# Patient Record
Sex: FEMALE | Race: BLACK OR AFRICAN AMERICAN | Employment: UNEMPLOYED | URBAN - METROPOLITAN AREA
[De-identification: names, ages, dates, MRNs, and addresses within clinical notes are randomized per-mention and may not be internally consistent; named-entity substitution may affect disease eponyms.]

---

## 2023-01-18 ENCOUNTER — APPOINTMENT (OUTPATIENT)
Dept: ULTRASOUND IMAGING | Age: 38
End: 2023-01-18

## 2023-01-18 ENCOUNTER — ANESTHESIA (OUTPATIENT)
Dept: OPERATING ROOM | Age: 38
End: 2023-01-18

## 2023-01-18 ENCOUNTER — ANESTHESIA EVENT (OUTPATIENT)
Dept: OPERATING ROOM | Age: 38
End: 2023-01-18

## 2023-01-18 ENCOUNTER — HOSPITAL ENCOUNTER (OUTPATIENT)
Age: 38
Discharge: HOME | End: 2023-01-19
Attending: OBSTETRICS & GYNECOLOGY | Admitting: OBSTETRICS & GYNECOLOGY

## 2023-01-18 DIAGNOSIS — O00.90 ECTOPIC PREGNANCY, UNSPECIFIED LOCATION, UNSPECIFIED WHETHER INTRAUTERINE PREGNANCY PRESENT: ICD-10-CM

## 2023-01-18 PROBLEM — O20.9 BLEEDING IN EARLY PREGNANCY: Status: ACTIVE | Noted: 2023-01-18

## 2023-01-18 LAB
ABO/RH: NORMAL
ABO/RH: NORMAL
AMPHETAMINES: NEGATIVE
ANTIBODY SCREEN: NEGATIVE
BACTERIA: ABNORMAL /HPF
BARBITURATE SCREEN URINE: NEGATIVE
BENZODIAZEPINE SCREEN, URINE: NEGATIVE
BILIRUBIN URINE: NEGATIVE MG/DL
BLOOD, URINE: NORMAL
CANNABINOID SCREEN URINE: NEGATIVE
CLARITY: CLEAR
COCAINE METABOLITE: NEGATIVE
COLOR: YELLOW
GLUCOSE, URINE: NEGATIVE MG/DL
GONADOTROPIN, CHORIONIC (HCG) QUANT: NORMAL UIU/ML
HCT VFR BLD CALC: 38.8 % (ref 37–47)
HEMOGLOBIN: 13.6 GM/DL (ref 12.5–16)
KETONES, URINE: NEGATIVE MG/DL
LEUKOCYTE ESTERASE, URINE: NORMAL
MCH RBC QN AUTO: 31.6 PG (ref 27–31)
MCHC RBC AUTO-ENTMCNC: 35.1 % (ref 32–36)
MCV RBC AUTO: 90 FL (ref 78–100)
MUCUS: ABNORMAL HPF
NITRITE URINE, QUANTITATIVE: NEGATIVE
OPIATES, URINE: NEGATIVE
OXYCODONE: NEGATIVE
PDW BLD-RTO: 11.9 % (ref 11.7–14.9)
PH, URINE: 7.5 (ref 5–8)
PHENCYCLIDINE, URINE: NEGATIVE
PLATELET # BLD: 327 K/CU MM (ref 140–440)
PMV BLD AUTO: 9.7 FL (ref 7.5–11.1)
PREGNANCY TEST URINE, POC: POSITIVE
PROTEIN UA: NEGATIVE MG/DL
RBC # BLD: 4.31 M/CU MM (ref 4.2–5.4)
RBC URINE: 7 /HPF (ref 0–6)
SPECIFIC GRAVITY UA: 1.01 (ref 1–1.03)
SQUAMOUS EPITHELIAL: 3 /HPF
TRICHOMONAS: ABNORMAL /HPF
UROBILINOGEN, URINE: 0.2 MG/DL (ref 0.2–1)
WBC # BLD: 5.1 K/CU MM (ref 4–10.5)
WBC UA: 6 /HPF (ref 0–5)

## 2023-01-18 PROCEDURE — 88302 TISSUE EXAM BY PATHOLOGIST: CPT | Performed by: PATHOLOGY

## 2023-01-18 PROCEDURE — 99214 OFFICE O/P EST MOD 30 MIN: CPT

## 2023-01-18 PROCEDURE — 85027 COMPLETE CBC AUTOMATED: CPT

## 2023-01-18 PROCEDURE — 2709999900 HC NON-CHARGEABLE SUPPLY: Performed by: OBSTETRICS & GYNECOLOGY

## 2023-01-18 PROCEDURE — 81025 URINE PREGNANCY TEST: CPT

## 2023-01-18 PROCEDURE — 81001 URINALYSIS AUTO W/SCOPE: CPT

## 2023-01-18 PROCEDURE — 76817 TRANSVAGINAL US OBSTETRIC: CPT

## 2023-01-18 PROCEDURE — 6370000000 HC RX 637 (ALT 250 FOR IP): Performed by: OBSTETRICS & GYNECOLOGY

## 2023-01-18 PROCEDURE — 3600000014 HC SURGERY LEVEL 4 ADDTL 15MIN: Performed by: OBSTETRICS & GYNECOLOGY

## 2023-01-18 PROCEDURE — 80307 DRUG TEST PRSMV CHEM ANLYZR: CPT

## 2023-01-18 PROCEDURE — 3700000001 HC ADD 15 MINUTES (ANESTHESIA): Performed by: OBSTETRICS & GYNECOLOGY

## 2023-01-18 PROCEDURE — 7100000000 HC PACU RECOVERY - FIRST 15 MIN: Performed by: OBSTETRICS & GYNECOLOGY

## 2023-01-18 PROCEDURE — 7100000001 HC PACU RECOVERY - ADDTL 15 MIN: Performed by: OBSTETRICS & GYNECOLOGY

## 2023-01-18 PROCEDURE — 84702 CHORIONIC GONADOTROPIN TEST: CPT

## 2023-01-18 PROCEDURE — 3700000000 HC ANESTHESIA ATTENDED CARE: Performed by: OBSTETRICS & GYNECOLOGY

## 2023-01-18 PROCEDURE — 93975 VASCULAR STUDY: CPT

## 2023-01-18 PROCEDURE — 86901 BLOOD TYPING SEROLOGIC RH(D): CPT

## 2023-01-18 PROCEDURE — 2500000003 HC RX 250 WO HCPCS

## 2023-01-18 PROCEDURE — 2720000010 HC SURG SUPPLY STERILE: Performed by: OBSTETRICS & GYNECOLOGY

## 2023-01-18 PROCEDURE — 86850 RBC ANTIBODY SCREEN: CPT

## 2023-01-18 PROCEDURE — 86900 BLOOD TYPING SEROLOGIC ABO: CPT

## 2023-01-18 PROCEDURE — 6360000002 HC RX W HCPCS

## 2023-01-18 PROCEDURE — 3600000004 HC SURGERY LEVEL 4 BASE: Performed by: OBSTETRICS & GYNECOLOGY

## 2023-01-18 RX ORDER — DIPHENHYDRAMINE HYDROCHLORIDE 50 MG/ML
12.5 INJECTION INTRAMUSCULAR; INTRAVENOUS
Status: DISCONTINUED | OUTPATIENT
Start: 2023-01-18 | End: 2023-01-18 | Stop reason: HOSPADM

## 2023-01-18 RX ORDER — PROCHLORPERAZINE EDISYLATE 5 MG/ML
5 INJECTION INTRAMUSCULAR; INTRAVENOUS
Status: DISCONTINUED | OUTPATIENT
Start: 2023-01-18 | End: 2023-01-18 | Stop reason: HOSPADM

## 2023-01-18 RX ORDER — MIDAZOLAM HYDROCHLORIDE 1 MG/ML
INJECTION INTRAMUSCULAR; INTRAVENOUS PRN
Status: DISCONTINUED | OUTPATIENT
Start: 2023-01-18 | End: 2023-01-18 | Stop reason: SDUPTHER

## 2023-01-18 RX ORDER — MIDAZOLAM HYDROCHLORIDE 2 MG/2ML
2 INJECTION, SOLUTION INTRAMUSCULAR; INTRAVENOUS
Status: DISCONTINUED | OUTPATIENT
Start: 2023-01-18 | End: 2023-01-18 | Stop reason: HOSPADM

## 2023-01-18 RX ORDER — IPRATROPIUM BROMIDE AND ALBUTEROL SULFATE 2.5; .5 MG/3ML; MG/3ML
1 SOLUTION RESPIRATORY (INHALATION)
Status: DISCONTINUED | OUTPATIENT
Start: 2023-01-18 | End: 2023-01-18 | Stop reason: HOSPADM

## 2023-01-18 RX ORDER — SODIUM CHLORIDE 0.9 % (FLUSH) 0.9 %
5-40 SYRINGE (ML) INJECTION EVERY 12 HOURS SCHEDULED
Status: DISCONTINUED | OUTPATIENT
Start: 2023-01-18 | End: 2023-01-19 | Stop reason: HOSPADM

## 2023-01-18 RX ORDER — ONDANSETRON 2 MG/ML
4 INJECTION INTRAMUSCULAR; INTRAVENOUS EVERY 6 HOURS PRN
Status: DISCONTINUED | OUTPATIENT
Start: 2023-01-18 | End: 2023-01-19 | Stop reason: HOSPADM

## 2023-01-18 RX ORDER — OXYCODONE HYDROCHLORIDE AND ACETAMINOPHEN 5; 325 MG/1; MG/1
1 TABLET ORAL EVERY 6 HOURS PRN
Qty: 14 TABLET | Refills: 0 | Status: SHIPPED | OUTPATIENT
Start: 2023-01-18 | End: 2023-01-18 | Stop reason: SDUPTHER

## 2023-01-18 RX ORDER — OXYCODONE HYDROCHLORIDE 5 MG/1
5 TABLET ORAL EVERY 4 HOURS PRN
Status: DISCONTINUED | OUTPATIENT
Start: 2023-01-18 | End: 2023-01-19 | Stop reason: HOSPADM

## 2023-01-18 RX ORDER — MEPERIDINE HYDROCHLORIDE 25 MG/ML
6.25 INJECTION INTRAMUSCULAR; INTRAVENOUS; SUBCUTANEOUS EVERY 5 MIN PRN
Status: DISCONTINUED | OUTPATIENT
Start: 2023-01-18 | End: 2023-01-18 | Stop reason: HOSPADM

## 2023-01-18 RX ORDER — OXYCODONE HYDROCHLORIDE AND ACETAMINOPHEN 5; 325 MG/1; MG/1
1 TABLET ORAL EVERY 6 HOURS PRN
Qty: 14 TABLET | Refills: 0 | Status: SHIPPED | OUTPATIENT
Start: 2023-01-18 | End: 2023-01-23

## 2023-01-18 RX ORDER — DEXMEDETOMIDINE HYDROCHLORIDE 100 UG/ML
INJECTION, SOLUTION INTRAVENOUS PRN
Status: DISCONTINUED | OUTPATIENT
Start: 2023-01-18 | End: 2023-01-18 | Stop reason: SDUPTHER

## 2023-01-18 RX ORDER — IBUPROFEN 800 MG/1
800 TABLET ORAL EVERY 8 HOURS PRN
Status: DISCONTINUED | OUTPATIENT
Start: 2023-01-18 | End: 2023-01-19 | Stop reason: HOSPADM

## 2023-01-18 RX ORDER — LABETALOL HYDROCHLORIDE 5 MG/ML
10 INJECTION, SOLUTION INTRAVENOUS
Status: DISCONTINUED | OUTPATIENT
Start: 2023-01-18 | End: 2023-01-18 | Stop reason: HOSPADM

## 2023-01-18 RX ORDER — ONDANSETRON 4 MG/1
4 TABLET, ORALLY DISINTEGRATING ORAL EVERY 8 HOURS PRN
Status: DISCONTINUED | OUTPATIENT
Start: 2023-01-18 | End: 2023-01-19 | Stop reason: HOSPADM

## 2023-01-18 RX ORDER — DEXAMETHASONE SODIUM PHOSPHATE 4 MG/ML
INJECTION, SOLUTION INTRA-ARTICULAR; INTRALESIONAL; INTRAMUSCULAR; INTRAVENOUS; SOFT TISSUE PRN
Status: DISCONTINUED | OUTPATIENT
Start: 2023-01-18 | End: 2023-01-18 | Stop reason: SDUPTHER

## 2023-01-18 RX ORDER — OXYCODONE HYDROCHLORIDE 5 MG/1
10 TABLET ORAL EVERY 4 HOURS PRN
Status: DISCONTINUED | OUTPATIENT
Start: 2023-01-18 | End: 2023-01-19 | Stop reason: HOSPADM

## 2023-01-18 RX ORDER — FENTANYL CITRATE 50 UG/ML
50 INJECTION, SOLUTION INTRAMUSCULAR; INTRAVENOUS EVERY 5 MIN PRN
Status: DISCONTINUED | OUTPATIENT
Start: 2023-01-18 | End: 2023-01-18 | Stop reason: HOSPADM

## 2023-01-18 RX ORDER — HALOPERIDOL 5 MG/ML
1 INJECTION INTRAMUSCULAR
Status: DISCONTINUED | OUTPATIENT
Start: 2023-01-18 | End: 2023-01-18 | Stop reason: HOSPADM

## 2023-01-18 RX ORDER — HYDRALAZINE HYDROCHLORIDE 20 MG/ML
10 INJECTION INTRAMUSCULAR; INTRAVENOUS
Status: DISCONTINUED | OUTPATIENT
Start: 2023-01-18 | End: 2023-01-18 | Stop reason: HOSPADM

## 2023-01-18 RX ORDER — SODIUM CHLORIDE 9 MG/ML
INJECTION, SOLUTION INTRAVENOUS PRN
Status: DISCONTINUED | OUTPATIENT
Start: 2023-01-18 | End: 2023-01-19 | Stop reason: HOSPADM

## 2023-01-18 RX ORDER — FENTANYL CITRATE 50 UG/ML
INJECTION, SOLUTION INTRAMUSCULAR; INTRAVENOUS PRN
Status: DISCONTINUED | OUTPATIENT
Start: 2023-01-18 | End: 2023-01-18 | Stop reason: SDUPTHER

## 2023-01-18 RX ORDER — KETOROLAC TROMETHAMINE 30 MG/ML
INJECTION, SOLUTION INTRAMUSCULAR; INTRAVENOUS PRN
Status: DISCONTINUED | OUTPATIENT
Start: 2023-01-18 | End: 2023-01-18 | Stop reason: SDUPTHER

## 2023-01-18 RX ORDER — ONDANSETRON 2 MG/ML
INJECTION INTRAMUSCULAR; INTRAVENOUS PRN
Status: DISCONTINUED | OUTPATIENT
Start: 2023-01-18 | End: 2023-01-18 | Stop reason: SDUPTHER

## 2023-01-18 RX ORDER — ROCURONIUM BROMIDE 10 MG/ML
INJECTION, SOLUTION INTRAVENOUS PRN
Status: DISCONTINUED | OUTPATIENT
Start: 2023-01-18 | End: 2023-01-18 | Stop reason: SDUPTHER

## 2023-01-18 RX ORDER — PROPOFOL 10 MG/ML
INJECTION, EMULSION INTRAVENOUS PRN
Status: DISCONTINUED | OUTPATIENT
Start: 2023-01-18 | End: 2023-01-18 | Stop reason: SDUPTHER

## 2023-01-18 RX ORDER — ACETAMINOPHEN 500 MG
1000 TABLET ORAL EVERY 8 HOURS PRN
Status: DISCONTINUED | OUTPATIENT
Start: 2023-01-18 | End: 2023-01-19 | Stop reason: HOSPADM

## 2023-01-18 RX ORDER — SODIUM CHLORIDE, SODIUM LACTATE, POTASSIUM CHLORIDE, CALCIUM CHLORIDE 600; 310; 30; 20 MG/100ML; MG/100ML; MG/100ML; MG/100ML
INJECTION, SOLUTION INTRAVENOUS CONTINUOUS PRN
Status: DISCONTINUED | OUTPATIENT
Start: 2023-01-18 | End: 2023-01-18 | Stop reason: SDUPTHER

## 2023-01-18 RX ORDER — SODIUM CHLORIDE 0.9 % (FLUSH) 0.9 %
5-40 SYRINGE (ML) INJECTION PRN
Status: DISCONTINUED | OUTPATIENT
Start: 2023-01-18 | End: 2023-01-19 | Stop reason: HOSPADM

## 2023-01-18 RX ORDER — LIDOCAINE HYDROCHLORIDE 20 MG/ML
INJECTION, SOLUTION EPIDURAL; INFILTRATION; INTRACAUDAL; PERINEURAL PRN
Status: DISCONTINUED | OUTPATIENT
Start: 2023-01-18 | End: 2023-01-18 | Stop reason: SDUPTHER

## 2023-01-18 RX ORDER — SUCCINYLCHOLINE/SOD CL,ISO/PF 100 MG/5ML
SYRINGE (ML) INTRAVENOUS PRN
Status: DISCONTINUED | OUTPATIENT
Start: 2023-01-18 | End: 2023-01-18 | Stop reason: SDUPTHER

## 2023-01-18 RX ADMIN — ACETAMINOPHEN 1000 MG: 500 TABLET ORAL at 21:08

## 2023-01-18 RX ADMIN — PROPOFOL 20 MG: 10 INJECTION, EMULSION INTRAVENOUS at 16:49

## 2023-01-18 RX ADMIN — ROCURONIUM BROMIDE 45 MG: 10 INJECTION INTRAVENOUS at 16:23

## 2023-01-18 RX ADMIN — DEXMEDETOMIDINE 10 MCG: 100 INJECTION, SOLUTION INTRAVENOUS at 16:40

## 2023-01-18 RX ADMIN — FENTANYL CITRATE 50 MCG: 50 INJECTION, SOLUTION INTRAMUSCULAR; INTRAVENOUS at 16:31

## 2023-01-18 RX ADMIN — DEXAMETHASONE SODIUM PHOSPHATE 8 MG: 4 INJECTION, SOLUTION INTRAMUSCULAR; INTRAVENOUS at 16:23

## 2023-01-18 RX ADMIN — PROPOFOL 50 MG: 10 INJECTION, EMULSION INTRAVENOUS at 16:44

## 2023-01-18 RX ADMIN — ROCURONIUM BROMIDE 5 MG: 10 INJECTION INTRAVENOUS at 16:15

## 2023-01-18 RX ADMIN — PROPOFOL 150 MG: 10 INJECTION, EMULSION INTRAVENOUS at 16:15

## 2023-01-18 RX ADMIN — SODIUM CHLORIDE, SODIUM LACTATE, POTASSIUM CHLORIDE, CALCIUM CHLORIDE: 600; 310; 30; 20 INJECTION, SOLUTION INTRAVENOUS at 16:10

## 2023-01-18 RX ADMIN — LIDOCAINE HYDROCHLORIDE 100 MG: 20 INJECTION, SOLUTION EPIDURAL; INFILTRATION; INTRACAUDAL; PERINEURAL at 16:15

## 2023-01-18 RX ADMIN — MIDAZOLAM 2 MG: 1 INJECTION INTRAMUSCULAR; INTRAVENOUS at 16:07

## 2023-01-18 RX ADMIN — Medication 100 MG: at 16:15

## 2023-01-18 RX ADMIN — PROPOFOL 30 MG: 10 INJECTION, EMULSION INTRAVENOUS at 16:51

## 2023-01-18 RX ADMIN — FENTANYL CITRATE 50 MCG: 50 INJECTION, SOLUTION INTRAMUSCULAR; INTRAVENOUS at 16:13

## 2023-01-18 RX ADMIN — KETOROLAC TROMETHAMINE 30 MG: 30 INJECTION, SOLUTION INTRAMUSCULAR; INTRAVENOUS at 17:10

## 2023-01-18 RX ADMIN — OXYCODONE HYDROCHLORIDE 5 MG: 5 TABLET ORAL at 21:08

## 2023-01-18 RX ADMIN — ONDANSETRON 4 MG: 2 INJECTION INTRAMUSCULAR; INTRAVENOUS at 16:47

## 2023-01-18 RX ADMIN — SUGAMMADEX 200 MG: 100 INJECTION, SOLUTION INTRAVENOUS at 16:53

## 2023-01-18 ASSESSMENT — PAIN DESCRIPTION - DESCRIPTORS
DESCRIPTORS: OTHER (COMMENT)
DESCRIPTORS: DISCOMFORT

## 2023-01-18 ASSESSMENT — PAIN DESCRIPTION - LOCATION: LOCATION: ABDOMEN

## 2023-01-18 ASSESSMENT — PAIN - FUNCTIONAL ASSESSMENT
PAIN_FUNCTIONAL_ASSESSMENT: ACTIVITIES ARE NOT PREVENTED
PAIN_FUNCTIONAL_ASSESSMENT: 0-10

## 2023-01-18 ASSESSMENT — PAIN SCALES - GENERAL
PAINLEVEL_OUTOF10: 0
PAINLEVEL_OUTOF10: 4

## 2023-01-18 ASSESSMENT — PAIN DESCRIPTION - FREQUENCY: FREQUENCY: INTERMITTENT

## 2023-01-18 ASSESSMENT — PAIN DESCRIPTION - PAIN TYPE: TYPE: SURGICAL PAIN

## 2023-01-18 ASSESSMENT — PAIN DESCRIPTION - ORIENTATION: ORIENTATION: MID

## 2023-01-18 NOTE — OP NOTE
OPERATIVE NOTE        LAPAROSCOPIC SALPINGOSTOMY        PATIENT:     Dragan Holden  DATE OF PROCEDURE:    2023    SURGEON:     Clarita Melendez MD    ASSISTANT:     None   PREOPERATIVE DIAGNOSIS:  left ectopic pregnancy   POSTOPERATIVE DIAGNOSES:  Same  OPERATION:     Laparoscopic left salpingectomy for ectopic pregnancy  ANESTHESIA:    General  ESTIMATED BLOOD LOSS:  Minimal   COMPLICATIONS:    None  SPECIMENS:    Products of Conception in the left fallopian tube      INDICATIONS:   This is a 41 y.o. female  at 7-8 weeks by LMP who presented to the ED with complaint of left pelvic pain and spotting. Ultrasound was performed that indicated a probable left fallopian tube ectopic pregnancy. The patient was not a candidate for Methotrexate treatment. The decision was then made to proceed with laparoscopy and left salpingectomy    FINDINGS:  On laparoscopic examination, a dilated left fallopian tube was identified, consistent with left fallopian tube ectopic pregnancy. The uterus and bilateral ovaries and fallopian tubes were freely moveable without signs of endometriosis or adhesions. CONSENT:   After discussing the risks, benefits, and alternatives were discussed with the patient with risks including, but not limited to, infection, bleeding, injury to bowel/bladder/ureters/ovaries/fallopian tubes, DVT/PE, anesthesia reaction, and death. The patient voiced understanding and informed consent was obtained. PROCEDURE:   The patient was taken to the operating room and placed under general anesthesia be the anesthesia department. The patient was then placed in the dorsal lithotomy position, and prepared and draped in a normal sterile fashion. A bimanual examination was performed. An Auvard speculum was placed into the vagina, and a uterine manipulator was placed. The speculum was removed. The bladder was drained with a Red Angulo catheter.      Attention was turned to the abdomen, where Marcaine 0.5% was injected at the infraumbilical site. A 5 mm infraumbilical incision was made, and a 5 mm non-bladed disposable trocar was inserted without difficulty using direct laparoscopic visualization. After assuring proper placement of the trocar, a pneumoperitoneum was created to a pressure of 15 mmHg, and the patient was placed in Trendelenburg position. Taking care to avoid the infraumbilical vessels, Marcaine 0.5% was injected in the left lower quadrant, and a 5 mm incision was made. A 5 mm non-bladed disposable trocar was inserted intra-abdomenally without difficulty using intraabdomenal laparoscopic visualization. The same procedure was performed in a like manner on the right side with an 11 mm port. The pelvis was viewed in its entirety, and the ectopic pregnancy was identified in the left fallopian tube. The distal tube was actively bleeding and the gestational sac was noted in the left distal tube. It was in the process of rupturing. She was not a candidate for salpingostomy. The Ligasure device was used to coagulate, incise and remove the left tube and hemostasis was noted. The pelvis was again irrigated, and hemostasis was noted. The tube was placed in an endobag and removed in the standared fashion. The pneumoperitoneum was then expelled into the atmosphere. The trocar sleeves were removed and the incisions were closed with 4-0 Monocryl suture. The RLQ 11mm port site fsacia was closed with 0 vicryl. And the skin incisions were closed with 3-0 chromic and bandaids. The patient was cleansed and bandages were applied. The uterine manipulator and all instruments were removed from the vagina. The patient was undraped, cleansed, and placed in a dorsal supine position. All counts were correct at the end of the procedure. The patient tolerated the procedure well and was taken to the recovery room in stable condition.     Mary Hills MD

## 2023-01-18 NOTE — ANESTHESIA PRE PROCEDURE
Department of Anesthesiology  Preprocedure Note       Name:  Paul Wang   Age:  41 y.o.  :  1985                                          MRN:  [id number]         Date:  2023      Surgeon: Larisa Jacobs):  Agnes Ricks MD    Procedure: Procedure(s):  LAPAROSCOPY EXPLORATORY LEFT SALPINGECTOMY    Medications prior to admission:   Prior to Admission medications    Not on File       Current medications:    No current facility-administered medications for this encounter. Allergies:  No Known Allergies    Problem List:    Patient Active Problem List   Diagnosis Code    Bleeding in early pregnancy O20.9       Past Medical History:        Diagnosis Date    Positive H. pylori test     Vaginal infection     pt reports she feels like she always has it and reports a bad odor after a few hours of a shower, discharge used to be white but now is cream colored       Past Surgical History:  History reviewed. No pertinent surgical history. Social History:    Social History     Tobacco Use    Smoking status: Never    Smokeless tobacco: Never   Substance Use Topics    Alcohol use: Not Currently     Comment: occassional beer or white rum                                Counseling given: Not Answered      Vital Signs (Current):   Vitals:    23 0939 23 0953   BP: 111/66    Pulse: 83    Resp: 16    Temp: 36.6 °C (97.9 °F)    TempSrc: Oral    SpO2: 100%    Weight:  136 lb (61.7 kg)   Height:  5' 2.21\" (1.58 m)                                              BP Readings from Last 3 Encounters:   23 111/66       NPO Status:                                                                                 BMI:   Wt Readings from Last 3 Encounters:   23 136 lb (61.7 kg)     Body mass index is 24.71 kg/m².     CBC:   Lab Results   Component Value Date/Time    WBC 5.1 2023 10:20 AM    RBC 4.31 2023 10:20 AM    HGB 13.6 2023 10:20 AM    HCT 38.8 2023 10:20 AM MCV 90.0 01/18/2023 10:20 AM    RDW 11.9 01/18/2023 10:20 AM     01/18/2023 10:20 AM       CMP: No results found for: NA, K, CL, CO2, BUN, CREATININE, GFRAA, AGRATIO, LABGLOM, GLUCOSE, GLU, PROT, CALCIUM, BILITOT, ALKPHOS, AST, ALT    POC Tests: No results for input(s): POCGLU, POCNA, POCK, POCCL, POCBUN, POCHEMO, POCHCT in the last 72 hours. Coags: No results found for: PROTIME, INR, APTT    HCG (If Applicable):   Lab Results   Component Value Date    PREGTESTUR POSITIVE (A) 01/18/2023        ABGs: No results found for: PHART, PO2ART, WPE6DCO, OSU3ZUB, BEART, E7WQGPFM     Type & Screen (If Applicable):  No results found for: LABABO, LABRH    Drug/Infectious Status (If Applicable):  No results found for: HIV, HEPCAB    COVID-19 Screening (If Applicable): No results found for: COVID19        Anesthesia Evaluation  Patient summary reviewed no history of anesthetic complications:   Airway: Mallampati: II  TM distance: >3 FB   Neck ROM: full  Mouth opening: > = 3 FB   Dental: normal exam         Pulmonary:Negative Pulmonary ROS breath sounds clear to auscultation                             Cardiovascular:Negative CV ROS  Exercise tolerance: good (>4 METS),           Rhythm: regular  Rate: normal           Beta Blocker:  Not on Beta Blocker         Neuro/Psych:   Negative Neuro/Psych ROS              GI/Hepatic/Renal: Neg GI/Hepatic/Renal ROS            Endo/Other:                      ROS comment: Pregnant  Abdominal:             Vascular: negative vascular ROS. Other Findings:           Anesthesia Plan      general     ASA 2 - emergent       Induction: rapid sequence and intravenous. MIPS: Postoperative opioids intended and Prophylactic antiemetics administered. Anesthetic plan and risks discussed with patient. Plan discussed with CRNA. Pre Anesthesia Evaluation complete. Anesthesia plan, risks, benefits, alternatives, and personnel involved discussed with patient.  Patients and/or legal guardian verbalized an understanding and agreed to proceed.  Duran Messina, DO  1/18/2023

## 2023-01-18 NOTE — FLOWSHEET NOTE
Patient presents to the [facility] ambulatory with concerns of vaginal bleeding and abd cramps. Patient reports she had her last period November 25, 2022 and had 4 positive pregnancy tests. Patient reports having 6 occasions of vaginal bleeding, sometimes just on the toilet paper when she wipes and sometimes some in the toilet. Pt reports having cramping with the bleeding. The last episode was this morning. Patient reports having some left-sided pain presently. Wheeled out with the pregnancy wheel by her LMP she is 7w5d. Patient oriented to room -04, instructed on [facility] u/a. Andi Malone  [id number] used to collect information.

## 2023-01-18 NOTE — PROGRESS NOTES
1702 Patient arrived to PACU, monitors applied and alarms on. Received report from Applaud CRNA. Patient asleep with oral airway in place. 1712 Patient arousing, oral airway removed. Patient taking deep breaths, maintaining 02 sats. No distress noted. [id number] Per , patient denies any questions or concerns. Patient aware she will return to LD 4 as planned. 1730 Patient taking ice chips, denies any nausea or complaints. 1740 Report called to [facility]. 1755 Patient continues to rest quietly, no distress noted.     [phone] Patient transported back to LD4.

## 2023-01-18 NOTE — H&P
Department of Gynecology  History & Physical            CHIEF COMPLAINT:     Chief Complaint   Patient presents with    Abdominal Pain    Vaginal Bleeding         HISTORY OF PRESENT ILLNESS:     The patient is a 41y.o. year old female, , with LMP12/22  with complaints of   Chief Complaint   Patient presents with    Abdominal Pain    Vaginal Bleeding   . Past Medical History:        Diagnosis Date    Positive H. pylori test     Vaginal infection     pt reports she feels like she always has it and reports a bad odor after a few hours of a shower, discharge used to be white but now is cream colored     Past Surgical History:    History reviewed. No pertinent surgical history. Past Gynecological History:    1.. Menses: 23 with irregular spotting fro  2. Contraception: None  3. Sexually transmitted disease history: none        A. Number of sexual partners in the last 6 months: 1    meds:No current facility-administered medications for this encounter. Allergies:  Patient has no known allergies. Social History:  TOBACCO:  Never used tobacco    Family History:   History reviewed. No pertinent family history. PHYSICAL EXAM:    Vitals:  /66   Pulse 83   Temp 97.9 °F (36.6 °C) (Oral)   Resp 16   Ht 5' 2.21\" (1.58 m)   Wt 136 lb (61.7 kg) Comment: varies between 136 and 145 lbs per pt report  LMP 2022   SpO2 100%   BMI 24.71 kg/m²     CONSTITUTIONAL:  awake, alert, cooperative, no apparent distress, and appears stated age  LUNGS:  No increased work of breathing, good air exchange, clear to auscultation bilaterally, no crackles or wheezing  CARDIOVASCULAR:  Normal apical impulse, regular rate and rhythm, normal S1 and S2, no S3 or S4, and no murmur noted  ABDOMEN:  No scars, normal bowel sounds, soft, non-distended.  No rebound or guarding  CHEST/BREASTS:  Breasts symmetrical, skin without lesion(s), no nipple retraction or dimpling, no nipple discharge, no masses palpated, no axillary or supraclavicular adenopathy    External Genitalia: General appearance normal  Old blood in the vaulr  Uterus:  Size normal, tender on TVUS    DATA:  Labs:  CBC:   Lab Results   Component Value Date/Time    WBC 5.1 01/18/2023 10:20 AM    RBC 4.31 01/18/2023 10:20 AM    HGB 13.6 01/18/2023 10:20 AM    HCT 38.8 01/18/2023 10:20 AM    MCV 90.0 01/18/2023 10:20 AM    RDW 11.9 01/18/2023 10:20 AM     01/18/2023 10:20 AM     Blood Type:  No results found for: ABOINT  RH:  No results found for: ANATITER, C3, C4, RF  Antibody Screen:  No components found for: ABSCINT  BHCG is 24K  TVUS with nothing visible in the uterus and some free fluid and tenderness with complex mass on the left. IMPRESSION/RECOMMENDATIONS:      Left ectopic pregnancy until proven otherwise. She last ate and drank at 7:30 this am. She is NPO now. Consent for D/X L/S with probable left salpingectomy possible oophorectomy and possible D&C is signed. R/B discussed at length. All questions answered. Agrees to proceed.  Surgery is notified and plan to roll down for surgery ~3:30    Zulma Penn MD  1/18/2023  2:07 PM

## 2023-01-18 NOTE — PROGRESS NOTES
Department of Obstetrics and Gynecology  Labor and Delivery  TRIAGE NOTE      SUBJECTIVE:    Pt here for Bleeding and abd pain. Pt states she has has at least 6 episodes of bleeding. OBJECTIVE    Vitals:  /66   Pulse 83   Temp 97.9 °F (36.6 °C) (Oral)   Resp 16   SpO2 100%       CONSTITUTIONAL:  negative  RESPIRATORY:  negative  CARDIOVASCULAR:  negative  GASTROINTESTINAL:  negative  ALLERGIC/IMMUNOLOGIC:  negative  NEUROLOGICAL:  negative  BEHAVIOR/PSYCH:  negative        ASSESSMENT:     41y.o.  year old  at Unknown  with Not found. Pt here for vaginal bleeding and abd pain. Has had about 6 episode of bleeding. Sometime just when she wipes and sometime it is in the toilet. Pain is mainly on the left side. Pt has not had any prenatal care at this point. LMP-2022 per pt. Has taken 4 pregnancy tests at home   POC preg + here in triage.      PLAN:  Will Draw ABO, HCG, CBC  Transvaginal Ultrasound   Send UA, JOSE Whatley - RAKESH

## 2023-01-18 NOTE — ANESTHESIA POSTPROCEDURE EVALUATION
Department of Anesthesiology  Postprocedure Note    Patient: Aba uMnroe  MRN: [id number]  YOB: 1985  Date of evaluation: 1/18/2023      Procedure Summary     Date: 01/18/23 Room / Location: [address]    Anesthesia Start: 1610 Anesthesia Stop: 1711    Procedure: LEFT SALPINGECTOMY (Left: Abdomen) Diagnosis:       Ectopic pregnancy, unspecified location, unspecified whether intrauterine pregnancy present      (Ectopic pregnancy, unspecified location, unspecified whether intrauterine pregnancy present [O00.90])    Surgeons: Julia Simpson MD Responsible Provider: Maxim Lindsay DO    Anesthesia Type: general ASA Status: 2 - Emergent          Anesthesia Type: No value filed.     Susan Phase I: Susan Score: 10    Susan Phase II:        Anesthesia Post Evaluation    Patient location during evaluation: PACU  Patient participation: complete - patient participated  Level of consciousness: sleepy but conscious  Airway patency: patent  Nausea & Vomiting: no nausea  Complications: no  Cardiovascular status: hemodynamically stable  Respiratory status: acceptable  Hydration status: euvolemic

## 2023-01-19 VITALS
OXYGEN SATURATION: 99 % | RESPIRATION RATE: 16 BRPM | DIASTOLIC BLOOD PRESSURE: 59 MMHG | BODY MASS INDEX: 25.03 KG/M2 | HEIGHT: 62 IN | TEMPERATURE: 96.3 F | WEIGHT: 136 LBS | HEART RATE: 85 BPM | SYSTOLIC BLOOD PRESSURE: 97 MMHG

## 2023-01-19 PROCEDURE — 6370000000 HC RX 637 (ALT 250 FOR IP): Performed by: ADVANCED PRACTICE MIDWIFE

## 2023-01-19 PROCEDURE — 6370000000 HC RX 637 (ALT 250 FOR IP): Performed by: OBSTETRICS & GYNECOLOGY

## 2023-01-19 RX ORDER — SIMETHICONE 80 MG
80 TABLET,CHEWABLE ORAL EVERY 6 HOURS PRN
Status: DISCONTINUED | OUTPATIENT
Start: 2023-01-19 | End: 2023-01-19 | Stop reason: HOSPADM

## 2023-01-19 RX ADMIN — OXYCODONE HYDROCHLORIDE 10 MG: 5 TABLET ORAL at 13:28

## 2023-01-19 RX ADMIN — ACETAMINOPHEN 1000 MG: 500 TABLET ORAL at 09:02

## 2023-01-19 RX ADMIN — SIMETHICONE 80 MG: 80 TABLET, CHEWABLE ORAL at 13:45

## 2023-01-19 RX ADMIN — IBUPROFEN 800 MG: 800 TABLET, FILM COATED ORAL at 05:50

## 2023-01-19 ASSESSMENT — PAIN DESCRIPTION - PAIN TYPE
TYPE: SURGICAL PAIN

## 2023-01-19 ASSESSMENT — PAIN DESCRIPTION - ONSET
ONSET: ON-GOING

## 2023-01-19 ASSESSMENT — PAIN DESCRIPTION - LOCATION
LOCATION: ABDOMEN

## 2023-01-19 ASSESSMENT — PAIN DESCRIPTION - FREQUENCY
FREQUENCY: INTERMITTENT

## 2023-01-19 ASSESSMENT — PAIN - FUNCTIONAL ASSESSMENT
PAIN_FUNCTIONAL_ASSESSMENT: ACTIVITIES ARE NOT PREVENTED

## 2023-01-19 ASSESSMENT — PAIN DESCRIPTION - DESCRIPTORS
DESCRIPTORS: SHARP
DESCRIPTORS: SHARP
DESCRIPTORS: ACHING
DESCRIPTORS: SHARP
DESCRIPTORS: ACHING
DESCRIPTORS: ACHING

## 2023-01-19 ASSESSMENT — PAIN DESCRIPTION - ORIENTATION
ORIENTATION: MID;UPPER
ORIENTATION: MID;UPPER
ORIENTATION: RIGHT
ORIENTATION: MID;UPPER
ORIENTATION: RIGHT
ORIENTATION: RIGHT

## 2023-01-19 ASSESSMENT — PAIN SCALES - GENERAL
PAINLEVEL_OUTOF10: 7
PAINLEVEL_OUTOF10: 5
PAINLEVEL_OUTOF10: 0
PAINLEVEL_OUTOF10: 3
PAINLEVEL_OUTOF10: 3
PAINLEVEL_OUTOF10: 2
PAINLEVEL_OUTOF10: 4

## 2023-01-19 NOTE — FLOWSHEET NOTE
Pt received from PACU. Assessment complete. Pt reports that she will not have a ride home until tomorrow morning. RN informed pt that she can stay overnight until ride home is available.

## 2023-01-19 NOTE — FLOWSHEET NOTE
Telephone report to Dr. CROW [facility] regarding pt concern of feeling pulsating in her abd. Dr. CROW [facility] reports due to patient being a small person she is probably feeling her aorta pulsating. Vital signs are stable, no bleeding noted.

## 2023-01-19 NOTE — PROGRESS NOTES
Outpatient Pharmacy Progress Note for Meds-to-Beds    Total number of Prescriptions Filled: 1  The following medications were dispensed to the patient during the discharge process:  Oxycodone-APAP    Additional Documentation:  Patient picked-up the medication(s) in the OP Pharmacy      Thank you for letting us serve your patients.   [address]    [address], [address] [facility]    Phone: [phone]    Fax: [phone]

## 2023-01-19 NOTE — FLOWSHEET NOTE
[id number] used to evaluate and review discharge information, do assessment, and make appointment for follow with Dr. Yash Samaniego, and order lunch.

## 2023-01-19 NOTE — FLOWSHEET NOTE
Patient to car via wheel chair after stopping by the pharmacy to  rx.  [id number] used to review medication instructions.

## (undated) DEVICE — BANDAGE ADH W1XL3IN NAT FAB WVN FLX DURABLE N ADH PD SEAL

## (undated) DEVICE — SYRINGE MED 10ML LUERLOCK TIP W/O SFTY DISP

## (undated) DEVICE — TRAY PREP DRY W/ PREM GLV 2 APPL 6 SPNG 2 UNDPD 1 OVERWRAP

## (undated) DEVICE — VIAL ACCESS CANNULA,SMART TIP: Brand: MONOJECT

## (undated) DEVICE — SUTURE VCRL SZ 0 L27IN ABSRB VLT L36MM CT-1 1/2 CIR J340H

## (undated) DEVICE — COUNTER NDL 30 COUNT FOAM STRP SGL MAG

## (undated) DEVICE — MATERNITY PAD,HEAVY: Brand: CURITY

## (undated) DEVICE — GOWN,SIRUS,POLYRNF,BRTHSLV,XLN/XL,20/CS: Brand: MEDLINE

## (undated) DEVICE — PACK,BASIC,SIRUS,V: Brand: MEDLINE

## (undated) DEVICE — INSTRUMENT LIGASURE 5MM BLUNT TIP 44CM

## (undated) DEVICE — TUBING INSUFFLATOR HEAT HI FLO SET PNEUMOCLEAR

## (undated) DEVICE — COVER,MAYO STAND,STERILE: Brand: MEDLINE

## (undated) DEVICE — NEEDLE HYPO 23GA L1.5IN TURQ POLYPR HUB S STL THN WALL IM

## (undated) DEVICE — GAUZE,SPONGE,4"X4",16PLY,XRAY,STRL,LF: Brand: MEDLINE

## (undated) DEVICE — LAPAROSCOPIC TROCAR SLEEVE/SINGLE USE: Brand: KII® OPTICAL ACCESS SYSTEM

## (undated) DEVICE — TOWEL,OR,DSP,ST,BLUE,STD,6/PK,12PK/CS: Brand: MEDLINE

## (undated) DEVICE — CATHETER,URETHRAL,VINYL,MALE,16",16 FR: Brand: MEDLINE

## (undated) DEVICE — TROCAR: Brand: KII FIOS FIRST ENTRY

## (undated) DEVICE — INTENDED FOR TISSUE SEPARATION, AND OTHER PROCEDURES THAT REQUIRE A SHARP SURGICAL BLADE TO PUNCTURE OR CUT.: Brand: BARD-PARKER ® STAINLESS STEEL BLADES

## (undated) DEVICE — GLOVE ORANGE PI 8   MSG9080

## (undated) DEVICE — TISSUE RETRIEVAL SYSTEM: Brand: INZII RETRIEVAL SYSTEM

## (undated) DEVICE — GLOVE ORANGE PI 7 1/2   MSG9075

## (undated) DEVICE — DRAPE, LAVH, STERILE: Brand: MEDLINE

## (undated) DEVICE — KIT,ANTI FOG,W/SPONGE & FLUID,SOFT PACK: Brand: MEDLINE

## (undated) DEVICE — STERILE LATEX POWDER-FREE SURGICAL GLOVESWITH NITRILE COATING: Brand: PROTEXIS

## (undated) DEVICE — SUTURE CHROMIC GUT SZ 3-0 L27IN ABSRB BRN L26MM SH 1/2 CIR G122H

## (undated) DEVICE — SET TBNG DISP TIP FOR AHTO